# Patient Record
Sex: MALE | Race: WHITE | NOT HISPANIC OR LATINO | Employment: FULL TIME | URBAN - METROPOLITAN AREA
[De-identification: names, ages, dates, MRNs, and addresses within clinical notes are randomized per-mention and may not be internally consistent; named-entity substitution may affect disease eponyms.]

---

## 2019-01-02 ENCOUNTER — HOSPITAL ENCOUNTER (EMERGENCY)
Facility: HOSPITAL | Age: 29
Discharge: HOME | End: 2019-01-02
Attending: EMERGENCY MEDICINE

## 2019-01-02 VITALS
SYSTOLIC BLOOD PRESSURE: 148 MMHG | WEIGHT: 185 LBS | BODY MASS INDEX: 24.52 KG/M2 | DIASTOLIC BLOOD PRESSURE: 73 MMHG | HEIGHT: 73 IN | HEART RATE: 91 BPM | TEMPERATURE: 99 F | RESPIRATION RATE: 14 BRPM | OXYGEN SATURATION: 98 %

## 2019-01-02 DIAGNOSIS — R05.9 COUGH: ICD-10-CM

## 2019-01-02 DIAGNOSIS — J06.9 VIRAL URI WITH COUGH: Primary | ICD-10-CM

## 2019-01-02 PROCEDURE — 99284 EMERGENCY DEPT VISIT MOD MDM: CPT

## 2019-01-02 RX ORDER — FLUTICASONE PROPIONATE 50 MCG
1 SPRAY, SUSPENSION (ML) NASAL 2 TIMES DAILY PRN
Qty: 15 G | Refills: 0 | Status: SHIPPED | OUTPATIENT
Start: 2019-01-02

## 2019-01-02 RX ORDER — ALBUTEROL SULFATE 90 UG/1
1-2 AEROSOL, METERED RESPIRATORY (INHALATION) EVERY 6 HOURS PRN
Qty: 1 INHALER | Refills: 0 | Status: SHIPPED | OUTPATIENT
Start: 2019-01-02 | End: 2020-01-02

## 2019-01-02 RX ORDER — GUAIFENESIN/DEXTROMETHORPHAN 100-10MG/5
5 SYRUP ORAL 4 TIMES DAILY PRN
Qty: 120 ML | Refills: 0 | Status: SHIPPED | OUTPATIENT
Start: 2019-01-02 | End: 2019-01-12

## 2019-01-02 NOTE — ED PROVIDER NOTES
Encounter Date: 1/2/2019    SCRIBE #1 NOTE: I, Jarred Croninlilian, am scribing for, and in the presence of, Megan Broderick PA-C.       History     Chief Complaint   Patient presents with    Cough     L rib pain x 3 days.     Time seen by provider: 2:50 PM on 01/02/2019      Joaquín Leija is a 28 y.o. male with no PMHx who presents to the ED for further evaluation of a cough that started 3 days. The patient states that these symptoms started with a unproductive cough, then developed some nasal congestion and a subjective fever. He denies taking his temperature at home. He reports that he has had pneumonia several times and is afraid he could have it again. He denies taking any medication for the cough. He denies chest pain, abdominal pain, headache, and back pain. He relays that he has no PSHx. He reports that he is a current everyday smoker 1 pack per day.       The history is provided by the patient.     Review of patient's allergies indicates:  No Known Allergies  History reviewed. No pertinent past medical history.  History reviewed. No pertinent surgical history.  History reviewed. No pertinent family history.  Social History     Tobacco Use    Smoking status: Current Every Day Smoker     Packs/day: 1.00     Types: Cigarettes   Substance Use Topics    Alcohol use: No    Drug use: Not on file     Review of Systems   Constitutional: Positive for fever (Subjective). Negative for activity change, appetite change and chills.   HENT: Positive for congestion. Negative for rhinorrhea and sore throat.    Eyes: Negative for redness and visual disturbance.   Respiratory: Positive for cough. Negative for chest tightness and shortness of breath.    Cardiovascular: Negative for chest pain.   Gastrointestinal: Negative for abdominal pain, diarrhea, nausea and vomiting.   Genitourinary: Negative for dysuria and frequency.   Musculoskeletal: Negative for back pain, neck pain and neck stiffness.   Skin: Negative  for rash.   Neurological: Negative for dizziness, syncope, numbness and headaches.       Physical Exam     Initial Vitals [01/02/19 1432]   BP Pulse Resp Temp SpO2   (!) 148/73 91 14 98.9 °F (37.2 °C) 98 %      MAP       --         Physical Exam    Nursing note and vitals reviewed.  Constitutional: He appears well-developed and well-nourished. He is cooperative.  Non-toxic appearance. He does not have a sickly appearance.   HENT:   Head: Normocephalic and atraumatic.   Right Ear: External ear normal.   Left Ear: External ear normal.   Nose: Nose normal.   Mouth/Throat: Oropharynx is clear and moist.   Eyes: Conjunctivae and lids are normal. Pupils are equal, round, and reactive to light.   Neck: Normal range of motion and full passive range of motion without pain. Neck supple.   Cardiovascular: Normal rate, regular rhythm and normal heart sounds. Exam reveals no gallop and no friction rub.    No murmur heard.  Pulmonary/Chest: Breath sounds normal. He has no wheezes. He has no rhonchi. He has no rales.   Abdominal: Soft. Normal appearance. There is no tenderness. There is no rigidity, no rebound and no guarding.   Neurological: He is alert and oriented to person, place, and time.   Skin: Skin is warm, dry and intact. No rash noted.         ED Course   Procedures  Labs Reviewed - No data to display       Imaging Results          X-Ray Chest PA And Lateral (Final result)  Result time 01/02/19 14:47:34    Final result by Serina Naqvi MD (01/02/19 14:47:34)                 Impression:      No acute cardiopulmonary disease.      Electronically signed by: Serina Naqvi MD  Date:    01/02/2019  Time:    14:47             Narrative:    EXAMINATION:  XR CHEST PA AND LATERAL    CLINICAL HISTORY:  Cough    TECHNIQUE:  PA and lateral views of the chest were performed.    COMPARISON:  5/5/2015    FINDINGS:  The heart is not enlarged.  The lungs are clear of infiltrate.  There is no pleural effusion.                                  Medical Decision Making:   History:   I obtained history from: someone other than patient.  Old Medical Records: I decided to obtain old medical records.  Clinical Tests:   Radiological Study: Ordered and Reviewed       APC / Resident Notes:   Urgent evaluation of a 28 year old male who presents with complaint of congestion, rhinorrhea, cough and subjective fever.  No abdominal pain, nausea or vomiting.  Vital signs are stable.  Patient is afebrile.  Abdomen is soft and nontender.  There is no rebound, rigidity or distention.  I doubt intra-abdominal process.  Bilateral TMs with no erythema, retraction or perforation.  There is no mastoid tenderness.  There is no movement tenderness to bilateral ears.  No tonsillar swelling or exudate noted.  Uvula is midline.  No concern for ludwigs angina.  Breath sounds are clear and equal bilaterally. Workup is negative.  Suspect symptoms are secondary to viral illness.  Symptomatic treatment. Discussed results with patient. Return precautions given. Patient is to follow up with their primary care provider. Case was discussed with Dr. Denton who is in agreement with the plan of care. All questions answered.          Scribe Attestation:   Scribe #1: I performed the above scribed service and the documentation accurately describes the services I performed. I attest to the accuracy of the note.    I, Megan Broderick PA-C, personally performed the services described in this documentation. All medical record entries made by the scribe were at my direction and in my presence.  I have reviewed the chart and agree that the record reflects my personal performance and is accurate and complete. Megan Broderick PA-C.  5:31 PM 01/02/2019             Clinical Impression:   The primary encounter diagnosis was Viral URI with cough. A diagnosis of Cough was also pertinent to this visit.      Disposition:   Disposition: Discharged  Condition: Stable                         Megan Broderick PA-C  01/02/19 [id number]

## 2019-01-02 NOTE — DISCHARGE INSTRUCTIONS
Stop smoking.  Take medications as prescribed.  For worsening symptoms, chest pain, shortness of breath, increased abdominal pain, high grade fever, stroke or stroke like symptoms, immediately go to the nearest Emergency Room or call 911 as soon as possible.

## 2021-02-21 ENCOUNTER — HOSPITAL ENCOUNTER (EMERGENCY)
Facility: HOSPITAL | Age: 31
Discharge: HOME | End: 2021-02-21
Attending: EMERGENCY MEDICINE

## 2021-02-21 VITALS
HEIGHT: 72 IN | OXYGEN SATURATION: 99 % | WEIGHT: 190 LBS | BODY MASS INDEX: 25.73 KG/M2 | SYSTOLIC BLOOD PRESSURE: 139 MMHG | DIASTOLIC BLOOD PRESSURE: 76 MMHG | HEART RATE: 98 BPM | TEMPERATURE: 98 F | RESPIRATION RATE: 20 BRPM

## 2021-02-21 DIAGNOSIS — M94.0 COSTOCHONDRITIS, ACUTE: ICD-10-CM

## 2021-02-21 DIAGNOSIS — R07.81 RIB PAIN: Primary | ICD-10-CM

## 2021-02-21 DIAGNOSIS — R52 PAIN: ICD-10-CM

## 2021-02-21 PROCEDURE — 99284 EMERGENCY DEPT VISIT MOD MDM: CPT | Mod: 25

## 2021-02-21 PROCEDURE — 93010 EKG 12-LEAD: ICD-10-PCS | Mod: ,,, | Performed by: SPECIALIST

## 2021-02-21 PROCEDURE — 63600175 PHARM REV CODE 636 W HCPCS: Performed by: NURSE PRACTITIONER

## 2021-02-21 PROCEDURE — 25000003 PHARM REV CODE 250: Performed by: NURSE PRACTITIONER

## 2021-02-21 PROCEDURE — 93010 ELECTROCARDIOGRAM REPORT: CPT | Mod: ,,, | Performed by: SPECIALIST

## 2021-02-21 PROCEDURE — 96372 THER/PROPH/DIAG INJ SC/IM: CPT

## 2021-02-21 PROCEDURE — 93005 ELECTROCARDIOGRAM TRACING: CPT

## 2021-02-21 RX ORDER — KETOROLAC TROMETHAMINE 30 MG/ML
15 INJECTION, SOLUTION INTRAMUSCULAR; INTRAVENOUS
Status: COMPLETED | OUTPATIENT
Start: 2021-02-21 | End: 2021-02-21

## 2021-02-21 RX ORDER — LIDOCAINE 50 MG/G
1 PATCH TOPICAL
Status: DISCONTINUED | OUTPATIENT
Start: 2021-02-21 | End: 2021-02-21 | Stop reason: HOSPADM

## 2021-02-21 RX ADMIN — LIDOCAINE 1 PATCH: 50 PATCH TOPICAL at 12:02

## 2021-02-21 RX ADMIN — KETOROLAC TROMETHAMINE 15 MG: 30 INJECTION, SOLUTION INTRAMUSCULAR at 12:02

## 2021-07-03 ENCOUNTER — HOSPITAL ENCOUNTER (EMERGENCY)
Facility: HOSPITAL | Age: 31
Discharge: HOME | End: 2021-07-03
Attending: EMERGENCY MEDICINE

## 2021-07-03 VITALS
WEIGHT: 200 LBS | HEART RATE: 89 BPM | DIASTOLIC BLOOD PRESSURE: 81 MMHG | HEIGHT: 72 IN | RESPIRATION RATE: 18 BRPM | SYSTOLIC BLOOD PRESSURE: 132 MMHG | TEMPERATURE: 99 F | BODY MASS INDEX: 27.09 KG/M2 | OXYGEN SATURATION: 98 %

## 2021-07-03 DIAGNOSIS — M25.511 ACUTE PAIN OF RIGHT SHOULDER: Primary | ICD-10-CM

## 2021-07-03 PROCEDURE — 99281 EMR DPT VST MAYX REQ PHY/QHP: CPT

## 2022-05-09 ENCOUNTER — HOSPITAL ENCOUNTER (EMERGENCY)
Facility: HOSPITAL | Age: 32
Discharge: HOME | End: 2022-05-09
Attending: EMERGENCY MEDICINE
Payer: COMMERCIAL

## 2022-05-09 VITALS
DIASTOLIC BLOOD PRESSURE: 72 MMHG | HEIGHT: 72 IN | SYSTOLIC BLOOD PRESSURE: 139 MMHG | OXYGEN SATURATION: 98 % | RESPIRATION RATE: 17 BRPM | BODY MASS INDEX: 27.09 KG/M2 | WEIGHT: 200 LBS | TEMPERATURE: 99 F | HEART RATE: 72 BPM

## 2022-05-09 DIAGNOSIS — M25.511 RIGHT SHOULDER PAIN: ICD-10-CM

## 2022-05-09 DIAGNOSIS — R07.9 CHEST PAIN: ICD-10-CM

## 2022-05-09 DIAGNOSIS — R07.89 CHEST DISCOMFORT: ICD-10-CM

## 2022-05-09 LAB
ALBUMIN SERPL BCP-MCNC: 3.9 G/DL (ref 3.5–5.2)
ALP SERPL-CCNC: 72 U/L (ref 55–135)
ALT SERPL W/O P-5'-P-CCNC: 15 U/L (ref 10–44)
ANION GAP SERPL CALC-SCNC: 8 MMOL/L (ref 8–16)
AST SERPL-CCNC: 15 U/L (ref 10–40)
BASOPHILS # BLD AUTO: 0.06 K/UL (ref 0–0.2)
BASOPHILS NFR BLD: 0.6 % (ref 0–1.9)
BILIRUB SERPL-MCNC: 0.8 MG/DL (ref 0.1–1)
BUN SERPL-MCNC: 10 MG/DL (ref 6–20)
CALCIUM SERPL-MCNC: 9.4 MG/DL (ref 8.7–10.5)
CHLORIDE SERPL-SCNC: 108 MMOL/L (ref 95–110)
CO2 SERPL-SCNC: 23 MMOL/L (ref 23–29)
CREAT SERPL-MCNC: 0.9 MG/DL (ref 0.5–1.4)
DIFFERENTIAL METHOD: NORMAL
EOSINOPHIL # BLD AUTO: 0.2 K/UL (ref 0–0.5)
EOSINOPHIL NFR BLD: 1.6 % (ref 0–8)
ERYTHROCYTE [DISTWIDTH] IN BLOOD BY AUTOMATED COUNT: 12.8 % (ref 11.5–14.5)
EST. GFR  (AFRICAN AMERICAN): >60 ML/MIN/1.73 M^2
EST. GFR  (NON AFRICAN AMERICAN): >60 ML/MIN/1.73 M^2
GLUCOSE SERPL-MCNC: 90 MG/DL (ref 70–110)
HCT VFR BLD AUTO: 43 % (ref 40–54)
HGB BLD-MCNC: 14.4 G/DL (ref 14–18)
IMM GRANULOCYTES # BLD AUTO: 0.03 K/UL (ref 0–0.04)
IMM GRANULOCYTES NFR BLD AUTO: 0.3 % (ref 0–0.5)
LYMPHOCYTES # BLD AUTO: 2.8 K/UL (ref 1–4.8)
LYMPHOCYTES NFR BLD: 28.7 % (ref 18–48)
MCH RBC QN AUTO: 29.7 PG (ref 27–31)
MCHC RBC AUTO-ENTMCNC: 33.5 G/DL (ref 32–36)
MCV RBC AUTO: 89 FL (ref 82–98)
MONOCYTES # BLD AUTO: 0.7 K/UL (ref 0.3–1)
MONOCYTES NFR BLD: 7.5 % (ref 4–15)
NEUTROPHILS # BLD AUTO: 6 K/UL (ref 1.8–7.7)
NEUTROPHILS NFR BLD: 61.3 % (ref 38–73)
NRBC BLD-RTO: 0 /100 WBC
PLATELET # BLD AUTO: 226 K/UL (ref 150–450)
PMV BLD AUTO: 10.6 FL (ref 9.2–12.9)
POTASSIUM SERPL-SCNC: 4.3 MMOL/L (ref 3.5–5.1)
PROT SERPL-MCNC: 7.4 G/DL (ref 6–8.4)
RBC # BLD AUTO: 4.85 M/UL (ref 4.6–6.2)
SODIUM SERPL-SCNC: 139 MMOL/L (ref 136–145)
TROPONIN I SERPL DL<=0.01 NG/ML-MCNC: <0.006 NG/ML (ref 0–0.03)
TROPONIN I SERPL DL<=0.01 NG/ML-MCNC: <0.006 NG/ML (ref 0–0.03)
WBC # BLD AUTO: 9.81 K/UL (ref 3.9–12.7)

## 2022-05-09 PROCEDURE — 93005 ELECTROCARDIOGRAM TRACING: CPT

## 2022-05-09 PROCEDURE — 85025 COMPLETE CBC W/AUTO DIFF WBC: CPT | Performed by: PHYSICIAN ASSISTANT

## 2022-05-09 PROCEDURE — 99285 EMERGENCY DEPT VISIT HI MDM: CPT | Mod: 25

## 2022-05-09 PROCEDURE — 80053 COMPREHEN METABOLIC PANEL: CPT | Performed by: PHYSICIAN ASSISTANT

## 2022-05-09 PROCEDURE — 93010 EKG 12-LEAD: ICD-10-PCS | Mod: ,,, | Performed by: GENERAL PRACTICE

## 2022-05-09 PROCEDURE — 84484 ASSAY OF TROPONIN QUANT: CPT | Performed by: EMERGENCY MEDICINE

## 2022-05-09 PROCEDURE — 36415 COLL VENOUS BLD VENIPUNCTURE: CPT | Performed by: EMERGENCY MEDICINE

## 2022-05-09 PROCEDURE — 93010 ELECTROCARDIOGRAM REPORT: CPT | Mod: ,,, | Performed by: GENERAL PRACTICE

## 2022-05-09 PROCEDURE — 84484 ASSAY OF TROPONIN QUANT: CPT | Mod: 91 | Performed by: PHYSICIAN ASSISTANT

## 2022-05-09 RX ORDER — ASPIRIN 325 MG
325 TABLET ORAL DAILY
Status: DISCONTINUED | OUTPATIENT
Start: 2022-05-10 | End: 2022-05-10 | Stop reason: HOSPADM

## 2022-05-09 NOTE — FIRST PROVIDER EVALUATION
" Emergency Department TeleTriage Encounter Note      CHIEF COMPLAINT    Chief Complaint   Patient presents with    Chest Pain     " For months"       VITAL SIGNS   Initial Vitals [05/09/22 1613]   BP Pulse Resp Temp SpO2   (!) 159/69 77 18 99 °F (37.2 °C) 97 %      MAP       --            ALLERGIES    Review of patient's allergies indicates:  No Known Allergies    PROVIDER TRIAGE NOTE  This is a teletriage evaluation of a 31 y.o. male presenting to the ED with c/o several months of intermittent R sided chest pains, near constant over the past 3 days. +cigarette use. No h/o chest trauma, no cardiac Hx. No known DM or early familial CAD.  No PE history or risk factors.    PE: R sided CP. Non-toxic/well-appearing. No respiratory distress, speaks in full sentences without issue. No active emesis nor cough. Normal eye contact and mentation.     Plan: EKG, labs/trop, CXR, ASA. Further/augmented workup at discretion of examining provider.     All ED beds are full at present; patient notified of this status.  Patient seen and medically screened by KRISTI via teletriage. Orders initiated at triage to expedite care.  Patient is stable and will be placed in an ED bed when available.  Care will be transferred to an alternate provider when patient has been placed in an Exam Room further exam, additional orders, and disposition.         ORDERS  Labs Reviewed   CBC W/ AUTO DIFFERENTIAL   COMPREHENSIVE METABOLIC PANEL   TROPONIN I       ED Orders (720h ago, onward)    Start Ordered     Status Ordering Provider    05/10/22 0900 05/09/22 1627  aspirin tablet 325 mg  Daily         Ordered TOM REID    05/09/22 1624 05/09/22 1625  X-Ray Chest AP Portable  1 time imaging         Ordered TOM REID    05/09/22 1623 05/09/22 1625  Complete Blood Count (CBC)  STAT         Pending Collection TOM REID    05/09/22 1623 05/09/22 1625  Comprehensive Metabolic Panel (CMP)  STAT         Pending Collection TOM REID "    05/09/22 1623 05/09/22 1625  Troponin I  STAT         Pending Collection TOM REID PRAKASH    05/09/22 1617 05/09/22 1616  EKG 12-lead  Once         Ordered RODRI CRENSHAW            Virtual Visit Note: The provider triage portion of this emergency department evaluation and documentation was performed via [employer], a HIPAA-compliant telemedicine application, in concert with a tele-presenter in the room. A face to face patient evaluation with one of my colleagues will occur once the patient is placed in an emergency department room.      DISCLAIMER: This note was prepared with "[employer] voice recognition transcription software. Garbled syntax, mangled pronouns, and other bizarre constructions may be attributed to that software system.

## 2022-05-10 NOTE — ED PROVIDER NOTES
"Encounter Date: 5/9/2022    SCRIBE #1 NOTE: I, Francheskaparvez Low, am scribing for, and in the presence of, Ishaan Diaz MD.       History     Chief Complaint   Patient presents with    Chest Pain     " For months"     Time seen by provider: 7:31 PM on 05/09/2022    Joaquín Leija is a 31 y.o. male who presents to the ED with an onset of right chest pain for months that he describes as tightness that has worsened, as well as recent fatigue. He states he does not wake up with the tightness and that it seems to start every time he starts thinking about his stressful job. He states it sometimes radiates down his right arm and he gets intermittent tingling and numbness to the fingers of his right hand. Patient also reports right shoulder and neck pain that he has had for several months. 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 He also noted there is clicking in his shoulder when he moves it. Patient reports he did have a fall in the shower onto his shoulder but he cannot recall if this was before or after the pain started. Patient denies recent physically strenuous activity. Patient smokes 1 pack of cigarettes per day. He denies FHx of cardiac issues. The patient denies SOB, sweating or any other symptoms at this time. No pertinent PSHx.      The history is provided by the patient.     Review of patient's allergies indicates:  No Known Allergies  No past medical history on file.  No past surgical history on file.  No family history on file.  Social History     Tobacco Use    Smoking status: Current Every Day Smoker     Packs/day: 1.00     Types: Cigarettes   Substance Use Topics    Alcohol use: No     Review of Systems   Constitutional: Positive for fatigue. Negative for diaphoresis and fever.   HENT: Negative for sore " throat.    Respiratory: Positive for chest tightness. Negative for shortness of breath.    Cardiovascular: Positive for chest pain.   Gastrointestinal: Negative for nausea.   Genitourinary: Negative for dysuria.   Musculoskeletal: Positive for arthralgias and neck pain. Negative for back pain.   Skin: Negative for rash.   Neurological: Positive for numbness. Negative for weakness.   Hematological: Does not bruise/bleed easily.       Physical Exam     Initial Vitals [05/09/22 1613]   BP Pulse Resp Temp SpO2   (!) 159/69 77 18 99 °F (37.2 °C) 97 %      MAP       --         Physical Exam    Nursing note and vitals reviewed.  Constitutional: He appears well-developed and well-nourished.  Non-toxic appearance. No distress.   HENT:   Head: Normocephalic and atraumatic.   Eyes: EOM are normal. Pupils are equal, round, and reactive to light.   Neck: Neck supple. No JVD present.   Normal range of motion.  Cardiovascular: Normal rate, regular rhythm, normal heart sounds and intact distal pulses. Exam reveals no gallop and no friction rub.    No murmur heard.  Pulmonary/Chest: Breath sounds normal. He has no wheezes. He has no rhonchi. He has no rales.   Musculoskeletal:         General: Normal range of motion.      Cervical back: Normal range of motion and neck supple. No rigidity.      Comments: Clicking with ROM of right shoulder.     Neurological: He is alert and oriented to person, place, and time. He has normal strength and normal reflexes. No cranial nerve deficit or sensory deficit. He exhibits normal muscle tone. Coordination normal. GCS eye subscore is 4. GCS verbal subscore is 5. GCS motor subscore is 6.   Skin: Skin is warm and dry.   Psychiatric: He has a normal mood and affect. His speech is normal and behavior is normal. He is not actively hallucinating.         ED Course   Procedures  Labs Reviewed   CBC W/ AUTO DIFFERENTIAL   COMPREHENSIVE METABOLIC PANEL   TROPONIN I   TROPONIN I     EKG Readings:  (Independently Interpreted)   Normal sinus rhythm at rate of 80 bpm with normal axis and normal intervals. No acute ST segment changes.        Imaging Results          X-Ray Shoulder Trauma Right (Final result)  Result time 05/09/22 20:43:04    Final result by Jordan Hogan MD (05/09/22 20:43:04)                 Impression:      No convincing evidence of acute fracture or dislocation.      Electronically signed by: Jordan Hogan  Date:    05/09/2022  Time:    20:43             Narrative:    EXAMINATION:  XR SHOULDER TRAUMA 3 VIEW RIGHT    CLINICAL HISTORY:  Pain in right shoulder    TECHNIQUE:  Three or four views of the right shoulder were performed.    COMPARISON:  None    FINDINGS:  No definite evidence of acute fracture or dislocation.  Joint spaces appear maintained.  No definite radiopaque foreign body.  No acute findings are identified in the visualized portions the chest or abdomen.                               X-Ray Chest AP Portable (Final result)  Result time 05/09/22 16:50:45    Final result by Mat Valdes MD (05/09/22 16:50:45)                 Impression:      Negative chest.      Electronically signed by: Mat Valdes MD  Date:    05/09/2022  Time:    16:50             Narrative:    EXAMINATION:  XR CHEST AP PORTABLE    CLINICAL HISTORY:  Chest pain, unspecified    TECHNIQUE:  Single frontal view of the chest was performed.    COMPARISON:  None    FINDINGS:  The cardiomediastinal silhouette is within normal limits.  The lungs are well expanded without consolidation or pleural effusion.                                 Medications   aspirin tablet 325 mg (has no administration in time range)     Medical Decision Making:   History:   Old Medical Records: I decided to obtain old medical records.  Initial Assessment:   31-year-old man with atypical right-sided daily chest pain, worse when he is feeling stressed out and other times associated with right shoulder pain and neck pain.   Patient's pain is right shoulder mainly when driving or relaxant shoulder down.  This is improved with raising the shoulder but states the pain becomes unbearable sometimes.  Currently he is not experiencing shoulder pain.  EKG shows no acute ischemic changes, cardiac workup with troponin x2 is negative.  Low heart score suggest discharge.  Patient has follow-up with PCP for further evaluation.  I do not think he needs admission at this time as he is low his for ACS.  I have low suspicion for PE as he is PERC negative.  Chest x-rays unremarkable.  Patient is not significantly anemic.  He has appropriate outpatient follow-up.  Return precautions discussed.  Suspect his pain may be related due to anxiety, possibly radiating from his right  Discharged in no acute distress.  Independently Interpreted Test(s):   I have ordered and independently interpreted X-rays - see prior notes.  I have ordered and independently interpreted EKG Reading(s) - see prior notes  Clinical Tests:   Lab Tests: Ordered and Reviewed  Radiological Study: Ordered and Reviewed  Medical Tests: Ordered and Reviewed          Scribe Attestation:   Scribe #1: I performed the above scribed service and the documentation accurately describes the services I performed. I attest to the accuracy of the note.               I, Joe Vogel, personally performed the services described in this documentation. All medical record entries made by the scribe were at my direction and in my presence.  I have reviewed the chart and agree that the record reflects my personal performance and is accurate and complete. Ishaan Diaz MD.  Clinical Impression:   Final diagnoses:  [R07.89] Chest discomfort  [R07.9] Chest pain  [M25.511] Right shoulder pain          ED Disposition Condition    Discharge Stable        ED Prescriptions     None        Follow-up Information    None          Ishaan Diaz MD  05/09/22 [id number]

## 2022-05-20 ENCOUNTER — OFFICE VISIT (OUTPATIENT)
Dept: SPORTS MEDICINE | Facility: CLINIC | Age: 32
End: 2022-05-20
Payer: COMMERCIAL

## 2022-05-20 VITALS
OXYGEN SATURATION: 97 % | DIASTOLIC BLOOD PRESSURE: 78 MMHG | BODY MASS INDEX: 27.08 KG/M2 | SYSTOLIC BLOOD PRESSURE: 134 MMHG | HEIGHT: 72 IN | HEART RATE: 77 BPM | WEIGHT: 199.94 LBS

## 2022-05-20 DIAGNOSIS — M75.41 IMPINGEMENT SYNDROME OF RIGHT SHOULDER: Primary | ICD-10-CM

## 2022-05-20 DIAGNOSIS — M25.511 CHRONIC RIGHT SHOULDER PAIN: ICD-10-CM

## 2022-05-20 DIAGNOSIS — G89.29 CHRONIC RIGHT SHOULDER PAIN: ICD-10-CM

## 2022-05-20 PROCEDURE — 1159F MED LIST DOCD IN RCRD: CPT | Mod: CPTII,S$GLB,, | Performed by: FAMILY MEDICINE

## 2022-05-20 PROCEDURE — 1160F RVW MEDS BY RX/DR IN RCRD: CPT | Mod: CPTII,S$GLB,, | Performed by: FAMILY MEDICINE

## 2022-05-20 PROCEDURE — 3075F PR MOST RECENT SYSTOLIC BLOOD PRESS GE 130-139MM HG: ICD-10-PCS | Mod: CPTII,S$GLB,, | Performed by: FAMILY MEDICINE

## 2022-05-20 PROCEDURE — 99999 PR PBB SHADOW E&M-EST. PATIENT-LVL III: CPT | Mod: PBBFAC,,, | Performed by: FAMILY MEDICINE

## 2022-05-20 PROCEDURE — 3078F DIAST BP <80 MM HG: CPT | Mod: CPTII,S$GLB,, | Performed by: FAMILY MEDICINE

## 2022-05-20 PROCEDURE — 3008F BODY MASS INDEX DOCD: CPT | Mod: CPTII,S$GLB,, | Performed by: FAMILY MEDICINE

## 2022-05-20 PROCEDURE — 99203 PR OFFICE/OUTPT VISIT, NEW, LEVL III, 30-44 MIN: ICD-10-PCS | Mod: S$GLB,,, | Performed by: FAMILY MEDICINE

## 2022-05-20 PROCEDURE — 1159F PR MEDICATION LIST DOCUMENTED IN MEDICAL RECORD: ICD-10-PCS | Mod: CPTII,S$GLB,, | Performed by: FAMILY MEDICINE

## 2022-05-20 PROCEDURE — 3075F SYST BP GE 130 - 139MM HG: CPT | Mod: CPTII,S$GLB,, | Performed by: FAMILY MEDICINE

## 2022-05-20 PROCEDURE — 99999 PR PBB SHADOW E&M-EST. PATIENT-LVL III: ICD-10-PCS | Mod: PBBFAC,,, | Performed by: FAMILY MEDICINE

## 2022-05-20 PROCEDURE — 3078F PR MOST RECENT DIASTOLIC BLOOD PRESSURE < 80 MM HG: ICD-10-PCS | Mod: CPTII,S$GLB,, | Performed by: FAMILY MEDICINE

## 2022-05-20 PROCEDURE — 3008F PR BODY MASS INDEX (BMI) DOCUMENTED: ICD-10-PCS | Mod: CPTII,S$GLB,, | Performed by: FAMILY MEDICINE

## 2022-05-20 PROCEDURE — 99203 OFFICE O/P NEW LOW 30 MIN: CPT | Mod: S$GLB,,, | Performed by: FAMILY MEDICINE

## 2022-05-20 PROCEDURE — 1160F PR REVIEW ALL MEDS BY PRESCRIBER/CLIN PHARMACIST DOCUMENTED: ICD-10-PCS | Mod: CPTII,S$GLB,, | Performed by: FAMILY MEDICINE

## 2022-05-20 RX ORDER — MELOXICAM 15 MG/1
15 TABLET ORAL DAILY PRN
Qty: 30 TABLET | Refills: 2 | Status: SHIPPED | OUTPATIENT
Start: 2022-05-20

## 2022-05-20 NOTE — PROGRESS NOTES
Subjective:          Chief Complaint: Joaquín Leija is a 31 y.o. male who had concerns including Pain of the Right Shoulder.    Patient is a 31-year-old male here today with complaints of right shoulder pain that has been present for about the last seven months.  First noticed it after he fell in the shower and landed on his right side.  Initially shoulder did not hurt right after the fall but he did start noticing after.  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  Localizes the pain mainly to the anterior lateral shoulder with radiation down the arm and sometimes at the neck.  Notes is made worse with long drives.  Does not wake him up from sleep.  He has tried using kratom and ibuprofen for pain relief and notes they help some.    Seen in the ER 11 days ago for unrelated issue when x-ray of the shoulder was performed that showed no acute abnormalities.      Review of Systems   Constitutional: Negative for chills and decreased appetite.   HENT: Negative for congestion and sore throat.    Eyes: Negative for blurred vision.   Cardiovascular: Negative for chest pain, dyspnea on exertion and palpitations.   Respiratory: Negative for cough and shortness of breath.    Skin: Negative for rash.   Neurological: Negative for difficulty with concentration, disturbances in coordination and headaches.   Psychiatric/Behavioral: Negative for altered mental status, depression, hallucinations, memory loss and suicidal ideas.                   Objective:        General: Joaquín is well-developed, well-nourished, appears stated age, in no acute distress, alert and oriented to time, place and person.     General    Nursing note and vitals reviewed.  Constitutional: He is oriented to person, place, and time. He appears well-developed and well-nourished.   HENT:   Nose: Nose normal.   Eyes: EOM are normal. Pupils are equal,  round, and reactive to light.   Neck: Neck supple.   Cardiovascular: Normal rate.    Pulmonary/Chest: Effort normal.   Abdominal: Soft.   Neurological: He is alert and oriented to person, place, and time. He has normal reflexes.   Psychiatric: He has a normal mood and affect. His behavior is normal. Judgment and thought content normal.         Right Shoulder Exam     Inspection/Observation   Swelling: absent  Bruising: absent  Scars: absent  Deformity: absent  Scapular Winging: absent  Scapular Dyskinesia: negative    Tenderness   The patient is tender to palpation of the acromioclavicular joint and supraspinatus.    Range of Motion   Active abduction: 150   Passive abduction: normal   Extension: normal   Forward Flexion: 150   Forward Elevation: normalAdduction: 90     Tests & Signs   Thibodeaux test: positive  Impingement: positive  Active Compression Test (Lamb's Sign): negative  Speed's Test: negative  Anterior Drawer Test: 0   Posterior Drawer Test: 0    Other   Sensation: normal    Left Shoulder Exam   Left shoulder exam is normal.      Muscle Strength   Right Upper Extremity   Shoulder Abduction: 5/5   Shoulder Internal Rotation: 5/5   Shoulder External Rotation: 5/5   Supraspinatus: 5/5   Subscapularis: 5/5   Biceps: 5/5     Vascular Exam     Right Pulses      Radial:                    2+          Xray images from ER visit 05/09 reviewed in clinic by me. They show relatively unremarkable shoulder x-ray. Not evidence of any acute fractures or dislocations. No degenerative changes noted.         Assessment:       Encounter Diagnoses   Name Primary?    Chronic right shoulder pain     Impingement syndrome of right shoulder Yes          Plan:         Discussed treatment options with patient including rest, heat, ice, anti-inflammatories both over-the-counter prescription, bracing, physical therapy both formal and at home, injection therapy with corticosteroids and biological agents, and surgical options.   Patient elected for conservative measures using meloxicam as needed for pain and performing HEP. Declined steroid injection today. If pain persists for longer than a few weeks return to clinic.                   Patient questionnaires may have been collected.

## 2023-03-05 ENCOUNTER — HOSPITAL ENCOUNTER (EMERGENCY)
Facility: HOSPITAL | Age: 33
Discharge: HOME | End: 2023-03-05
Attending: EMERGENCY MEDICINE

## 2023-03-05 VITALS
WEIGHT: 200 LBS | SYSTOLIC BLOOD PRESSURE: 134 MMHG | RESPIRATION RATE: 17 BRPM | TEMPERATURE: 97 F | DIASTOLIC BLOOD PRESSURE: 72 MMHG | HEART RATE: 70 BPM | OXYGEN SATURATION: 100 %

## 2023-03-05 DIAGNOSIS — K04.01 ACUTE PULPITIS: Primary | ICD-10-CM

## 2023-03-05 PROCEDURE — 63600175 PHARM REV CODE 636 W HCPCS: Performed by: EMERGENCY MEDICINE

## 2023-03-05 PROCEDURE — 99284 EMERGENCY DEPT VISIT MOD MDM: CPT

## 2023-03-05 PROCEDURE — 96372 THER/PROPH/DIAG INJ SC/IM: CPT | Performed by: EMERGENCY MEDICINE

## 2023-03-05 RX ORDER — KETOROLAC TROMETHAMINE 10 MG/1
10 TABLET, FILM COATED ORAL
Qty: 8 TABLET | Refills: 0 | Status: SHIPPED | OUTPATIENT
Start: 2023-03-05 | End: 2023-03-07

## 2023-03-05 RX ORDER — KETOROLAC TROMETHAMINE 30 MG/ML
30 INJECTION, SOLUTION INTRAMUSCULAR; INTRAVENOUS
Status: COMPLETED | OUTPATIENT
Start: 2023-03-05 | End: 2023-03-05

## 2023-03-05 RX ADMIN — KETOROLAC TROMETHAMINE 30 MG: 30 INJECTION, SOLUTION INTRAMUSCULAR; INTRAVENOUS at 07:03

## 2023-03-06 NOTE — ED PROVIDER NOTES
Encounter Date: 3/5/2023    SCRIBE #1 NOTE: I, Andieamadeo Bella, am scribing for, and in the presence of,  Junior Jo MD.     History     Chief Complaint   Patient presents with    Dental Pain     Pt c/o dental pain x 1 week. Pt saw his dentist on last week and started on abx. Pt states that the pain is getting worse and he thinks the abx isn't working. Pt scheduled to have his tooth removed on tomorrow.      Time seen by provider: 7:25 PM on 03/05/2023    Joaquín Leija is a 32 y.o. male who presents to the ED with an onset of left lower tooth pain that began a week ago and progressively worsened. Patient states the pain is waxing and waning and describes it as a 10/10 throbbing pain. He denies worsening pain with heat, cold, or pressure. He took 600 mg of Motrin 3 hours ago with mild relief. Patient is currently on 150 mg of Clindamycin by his oral surgeon. He was on the same antibiotic previously 3-4 months ago for this same issue but has not gotten the tooth pulled yet. He is scheduled to have the tooth removed tomorrow at 3 PM. The patient denies fever, nausea, vomiting, chills, chest pain, SOB or any other symptoms at this time. He has no recorded PMHx or PSHx.     The history is provided by the patient.   Review of patient's allergies indicates:   Allergen Reactions    Penicillins Hives     No past medical history on file.  No past surgical history on file.  No family history on file.     Review of Systems   Constitutional:  Negative for chills and fever.   HENT:  Positive for dental problem. Negative for congestion.    Eyes:  Negative for visual disturbance.   Respiratory:  Negative for shortness of breath and wheezing.    Cardiovascular:  Negative for chest pain.   Gastrointestinal:  Negative for abdominal pain, nausea and vomiting.   Genitourinary:  Negative for dysuria.   Musculoskeletal:  Negative for joint swelling.   Skin:  Negative for rash.   Neurological:  Negative for syncope.    Hematological:  Does not bruise/bleed easily.   Psychiatric/Behavioral:  Negative for confusion.      Physical Exam     Initial Vitals [03/05/23 1851]   BP Pulse Resp Temp SpO2   (!) 143/85 84 18 98.3 °F (36.8 °C) 98 %      MAP       --         Physical Exam    Nursing note and vitals reviewed.  Constitutional: Vital signs are normal. He appears well-nourished.   HENT:   Head: Normocephalic and atraumatic.   Mouth/Throat: Uvula is midline.   OTC packing material at tooth 17. Mild tenderness to touch at tooth 17. No exudate. No swelling. No signs of peritonsillar abscess.    Eyes: Conjunctivae and EOM are normal.   Neck: Neck supple. No thyroid mass present.   Normal range of motion.  Cardiovascular:  Normal rate, regular rhythm and normal heart sounds.     Exam reveals no gallop and no friction rub.       No murmur heard.  Pulmonary/Chest: Breath sounds normal. He has no wheezes. He has no rhonchi. He has no rales.   Abdominal: Abdomen is soft. Bowel sounds are normal. There is no abdominal tenderness.   Musculoskeletal:      Cervical back: Normal range of motion and neck supple.     Neurological: He is alert and oriented to person, place, and time. He has normal strength. No cranial nerve deficit or sensory deficit.   Skin: Skin is warm and dry. No rash noted. No erythema.   Psychiatric: He has a normal mood and affect. His speech is normal. Cognition and memory are normal.       ED Course   Procedures  Labs Reviewed - No data to display       Imaging Results    None          Medications   ketorolac injection 30 mg (30 mg Intramuscular Given 3/5/23 1941)     Medical Decision Making:   History:   Old Medical Records: I decided to obtain old medical records.  ED Management:  This is an urgent evaluation of a 32 y.o. male who presents with dental pain. Based on my exam and pt's history, I believe that they have a tooth infection with likely pulpitis/periapical abscess. There is no significant gingival swelling or  fluctuance to indicate need for incision and drainage at this time. Additionally, the patient does not have significant facial swelling, neck swelling, tongue elevation, drooling or dysphagia and therefore I doubt that the patient has facial cellulitis or Joel's angina at this time. Patient will be managed intramuscular anti-inflammatory here will be discharged with a short course of this.  He reports he is scheduled to follow up with his oral and maxillofacial surgeon tomorrow to have the tooth extracted.  He is also given specific return precautions. Patient expressed understanding and is discharged in stable condition.          Scribe Attestation:   Scribe #1: I performed the above scribed service and the documentation accurately describes the services I performed. I attest to the accuracy of the note.                 I, Dr. Junior Jo, personally performed the services described in this documentation. All medical record entries made by the scribe were at my direction and in my presence.  I have reviewed the chart and agree that the record reflects my personal performance and is accurate and complete. Junior Jo MD.  5:35 AM 03/06/2023    Clinical Impression:   Final diagnoses:  [K04.01] Acute pulpitis (Primary)        ED Disposition Condition    Discharge Stable          ED Prescriptions       Medication Sig Dispense Start Date End Date Auth. Provider    ketorolac (TORADOL) 10 mg tablet Take 1 tablet (10 mg total) by mouth every 6 to 8 hours as needed for Pain. 8 tablet 3/5/2023 3/7/2023 Junior Jo MD          Follow-up Information       Follow up With Specialties Details Why Contact Info    Berlin Garcia DDS Oral and Maxillofacial Surgery Schedule an appointment as soon as possible for a visit   [address] [place] [id number]  [phone]      Hermelindo Garcia DDS, MD Oral and Maxillofacial Surgery Schedule an appointment as soon as possible for a visit   [address]  [facility] [address]  [phone]               Junior Jo MD  03/06/23 0537